# Patient Record
Sex: MALE
[De-identification: names, ages, dates, MRNs, and addresses within clinical notes are randomized per-mention and may not be internally consistent; named-entity substitution may affect disease eponyms.]

---

## 2018-01-28 ENCOUNTER — HOSPITAL ENCOUNTER (EMERGENCY)
Dept: HOSPITAL 92 - ERS | Age: 38
Discharge: HOME | End: 2018-01-28
Payer: SELF-PAY

## 2018-01-28 DIAGNOSIS — M25.522: Primary | ICD-10-CM

## 2018-01-28 PROCEDURE — 96372 THER/PROPH/DIAG INJ SC/IM: CPT

## 2018-01-28 PROCEDURE — 29125 APPL SHORT ARM SPLINT STATIC: CPT

## 2018-01-28 NOTE — RAD
3 VIEWS ELBOW:

 

Date:  01/28/18 

 

HISTORY:  

Deformity. Patient was playing basketball and landed. 

 

COMPARISON:  

None. 

 

FINDINGS:

No joint effusion. No fracture. No cortical irregularity. No periosteal reaction. 

 

IMPRESSION: 

No fracture. If there is pain or point tenderness, immobilization and follow-up imaging in 7-10 days 
recommended. 

 

 

POS: [facility]